# Patient Record
Sex: MALE | URBAN - METROPOLITAN AREA
[De-identification: names, ages, dates, MRNs, and addresses within clinical notes are randomized per-mention and may not be internally consistent; named-entity substitution may affect disease eponyms.]

---

## 2023-01-13 ENCOUNTER — OUT OF OFFICE VISIT (OUTPATIENT)
Dept: URBAN - METROPOLITAN AREA MEDICAL CENTER 16 | Facility: MEDICAL CENTER | Age: 28
End: 2023-01-13
Payer: COMMERCIAL

## 2023-01-13 DIAGNOSIS — R93.3 ABN FINDINGS-GI TRACT: ICD-10-CM

## 2023-01-13 DIAGNOSIS — D50.8 ACQUIRED IRON DEFICIENCY ANEMIA DUE TO DECREASED ABSORPTION: ICD-10-CM

## 2023-01-13 DIAGNOSIS — K50.80 CROHN'S COLITIS: ICD-10-CM

## 2023-01-13 DIAGNOSIS — K92.1 ACUTE MELENA: ICD-10-CM

## 2023-01-13 PROCEDURE — G8427 DOCREV CUR MEDS BY ELIG CLIN: HCPCS | Performed by: INTERNAL MEDICINE

## 2023-01-13 PROCEDURE — 99223 1ST HOSP IP/OBS HIGH 75: CPT | Performed by: INTERNAL MEDICINE

## 2023-01-14 ENCOUNTER — OUT OF OFFICE VISIT (OUTPATIENT)
Dept: URBAN - METROPOLITAN AREA MEDICAL CENTER 16 | Facility: MEDICAL CENTER | Age: 28
End: 2023-01-14
Payer: COMMERCIAL

## 2023-01-14 DIAGNOSIS — K50.811 CROHN'S DISEASE OF BOTH SMALL AND LARGE INTESTINE WITH RECTAL BLEEDING: ICD-10-CM

## 2023-01-14 DIAGNOSIS — D62 ABLA (ACUTE BLOOD LOSS ANEMIA): ICD-10-CM

## 2023-01-14 PROCEDURE — 99232 SBSQ HOSP IP/OBS MODERATE 35: CPT | Performed by: INTERNAL MEDICINE

## 2025-03-26 ENCOUNTER — OFFICE VISIT (OUTPATIENT)
Dept: URBAN - METROPOLITAN AREA CLINIC 118 | Facility: CLINIC | Age: 30
End: 2025-03-26
Payer: COMMERCIAL

## 2025-03-26 ENCOUNTER — DASHBOARD ENCOUNTERS (OUTPATIENT)
Age: 30
End: 2025-03-26

## 2025-03-26 ENCOUNTER — LAB OUTSIDE AN ENCOUNTER (OUTPATIENT)
Dept: URBAN - METROPOLITAN AREA CLINIC 118 | Facility: CLINIC | Age: 30
End: 2025-03-26

## 2025-03-26 DIAGNOSIS — R19.8 RECTAL TENESMUS: ICD-10-CM

## 2025-03-26 DIAGNOSIS — K50.80 CROHN'S COLITIS: ICD-10-CM

## 2025-03-26 DIAGNOSIS — D50.8 OTHER IRON DEFICIENCY ANEMIA: ICD-10-CM

## 2025-03-26 DIAGNOSIS — R15.2 FECAL URGENCY: ICD-10-CM

## 2025-03-26 PROBLEM — 87522002: Status: ACTIVE | Noted: 2025-03-26

## 2025-03-26 PROBLEM — 34000006: Status: ACTIVE | Noted: 2025-03-26

## 2025-03-26 PROCEDURE — 99204 OFFICE O/P NEW MOD 45 MIN: CPT | Performed by: INTERNAL MEDICINE

## 2025-03-26 NOTE — HPI-TODAY'S VISIT:
Patient is a 30 year old male with PMH of Crohn's who presents to [facility]. Patient is a poor historian regarding previous medical history. Per patient, he was diagnosed with Crohn's in 2020 on colonoscopy while incarcerated. He was started on po Mesalamine without much relief of symptoms. Has also been treated with prednisone tapers. Records is [facility] show colonoscopy was going to be done in 2023, but cancelled due to hgb 6. Per patient, his most recent EGD/colonoscopy was in 5/2024. No records available. He was also in prison at this time. Per patient, he had one remicade infusion with improvement of symptoms. He was scheduled to have another, but then was released from assisted. Patient is currently not on any treatment. Normal bowel habits- very loose stools anywhere from 6-10 BMs daily. Reports 20+ when he's flaring. Reports change in bowel habits in the last 2-3 weeks. Currently having ~6 formed BMs daily. Associated urgency, tenesmus, and abdominal pain. Significant straining, but nothing comes out. Smokes marijuana for relief of tenesmus, which he refers to as constipation. Denies rectal bleeding or unintentional wt loss. Weight is stable.  Reports some belching with specific food triggers. Otherwise no UGI symptoms. Onset of bowel changes after he was treated at [facility] and [place] for insomnia / sleep deprivation and irritability with psych intervention. Reports poor sleep quality- goes to school during the day and works as  at night. Recently graduated and planning on working during the day.   Records in [facility] show CTAP in 2023 with findings of- 1. Pancolonic wall thickening is suspicious for inflammatory bowel disease/Crohn's. Colitis is not excluded. 2. Small bowel intussusception within the upper abdomen. 3. Bilateral spondylolysis of L5.

## 2025-03-29 LAB
A/G RATIO: 1.8
ABSOLUTE BASOPHILS: 117
ABSOLUTE EOSINOPHILS: 256
ABSOLUTE LYMPHOCYTES: 1672
ABSOLUTE MONOCYTES: 489
ABSOLUTE NEUTROPHILS: 4767
ALBUMIN: 4.6
ALKALINE PHOSPHATASE: 68
ALT (SGPT): 47
AST (SGOT): 32
BASOPHILS: 1.6
BILIRUBIN, TOTAL: 0.3
BUN/CREATININE RATIO: (no result)
BUN: 15
C-REACTIVE PROTEIN, QUANT: <3
CALCIUM: 9.5
CARBON DIOXIDE, TOTAL: 30
CHLORIDE: 102
CREATININE: 1.06
EGFR: 98
EOSINOPHILS: 3.5
FERRITIN, SERUM: 7
GLOBULIN, TOTAL: 2.5
GLUCOSE: 79
HEMATOCRIT: 37.5
HEMOGLOBIN: 10.5
HEPATITIS B CORE AB TOTAL: (no result)
HEPATITIS B SURFACE ANTIGEN: (no result)
IRON BIND.CAP.(TIBC): 478
IRON SATURATION: 2
IRON: 11
LYMPHOCYTES: 22.9
MCH: 21.8
MCHC: 28
MCV: 77.8
MITOGEN-NIL: >10
MONOCYTES: 6.7
MPV: 9.7
NEUTROPHILS: 65.3
PLATELET COUNT: 581
POTASSIUM: 4.7
PROTEIN, TOTAL: 7.1
QUANTIFERON NIL VALUE: 0.03
QUANTIFERON TB1 AG VALUE: <0
QUANTIFERON TB2 AG VALUE: <0
QUANTIFERON-TB GOLD PLUS: NEGATIVE
RDW: 18.4
RED BLOOD CELL COUNT: 4.82
SED RATE BY MODIFIED: 2
SODIUM: 139
VITAMIN D,25-OH,TOTAL,IA: 13
WHITE BLOOD CELL COUNT: 7.3

## 2025-03-31 ENCOUNTER — TELEPHONE ENCOUNTER (OUTPATIENT)
Dept: URBAN - METROPOLITAN AREA CLINIC 118 | Facility: CLINIC | Age: 30
End: 2025-03-31

## 2025-03-31 ENCOUNTER — LAB OUTSIDE AN ENCOUNTER (OUTPATIENT)
Dept: URBAN - METROPOLITAN AREA CLINIC 118 | Facility: CLINIC | Age: 30
End: 2025-03-31

## 2025-04-01 ENCOUNTER — TELEPHONE ENCOUNTER (OUTPATIENT)
Dept: URBAN - METROPOLITAN AREA CLINIC 6 | Facility: CLINIC | Age: 30
End: 2025-04-01

## 2025-04-08 ENCOUNTER — OFFICE VISIT (OUTPATIENT)
Dept: URBAN - METROPOLITAN AREA SURGERY CENTER 23 | Facility: SURGERY CENTER | Age: 30
End: 2025-04-08

## 2025-07-31 ENCOUNTER — TELEPHONE ENCOUNTER (OUTPATIENT)
Dept: URBAN - METROPOLITAN AREA CLINIC 118 | Facility: CLINIC | Age: 30
End: 2025-07-31

## 2025-08-06 ENCOUNTER — TELEPHONE ENCOUNTER (OUTPATIENT)
Dept: URBAN - METROPOLITAN AREA CLINIC 6 | Facility: CLINIC | Age: 30
End: 2025-08-06

## 2025-08-27 ENCOUNTER — OFFICE VISIT (OUTPATIENT)
Dept: URBAN - METROPOLITAN AREA CLINIC 118 | Facility: CLINIC | Age: 30
End: 2025-08-27
Payer: COMMERCIAL

## 2025-08-27 ENCOUNTER — LAB OUTSIDE AN ENCOUNTER (OUTPATIENT)
Dept: URBAN - METROPOLITAN AREA CLINIC 118 | Facility: CLINIC | Age: 30
End: 2025-08-27

## 2025-08-27 DIAGNOSIS — R15.2 FECAL URGENCY: ICD-10-CM

## 2025-08-27 DIAGNOSIS — K50.80: ICD-10-CM

## 2025-08-27 DIAGNOSIS — D50.8 OTHER IRON DEFICIENCY ANEMIA: ICD-10-CM

## 2025-08-27 DIAGNOSIS — R19.8 RECTAL TENESMUS: ICD-10-CM

## 2025-08-27 DIAGNOSIS — R19.7 CHRONIC DIARRHEA: ICD-10-CM

## 2025-08-27 PROCEDURE — 99214 OFFICE O/P EST MOD 30 MIN: CPT | Performed by: INTERNAL MEDICINE

## 2025-08-27 RX ORDER — FERROUS SULFATE 325(65) MG
1 TABLET TABLET ORAL
Status: ACTIVE | COMMUNITY